# Patient Record
Sex: FEMALE | Race: WHITE | NOT HISPANIC OR LATINO | Employment: OTHER | URBAN - METROPOLITAN AREA
[De-identification: names, ages, dates, MRNs, and addresses within clinical notes are randomized per-mention and may not be internally consistent; named-entity substitution may affect disease eponyms.]

---

## 2021-12-06 ENCOUNTER — HOSPITAL ENCOUNTER (EMERGENCY)
Facility: CLINIC | Age: 71
Discharge: HOME | End: 2021-12-06
Attending: EMERGENCY MEDICINE | Admitting: EMERGENCY MEDICINE
Payer: COMMERCIAL

## 2021-12-06 ENCOUNTER — APPOINTMENT (OUTPATIENT)
Dept: GENERAL RADIOLOGY | Facility: CLINIC | Age: 71
End: 2021-12-06
Attending: EMERGENCY MEDICINE
Payer: COMMERCIAL

## 2021-12-06 ENCOUNTER — APPOINTMENT (OUTPATIENT)
Dept: CT IMAGING | Facility: CLINIC | Age: 71
End: 2021-12-06
Attending: EMERGENCY MEDICINE
Payer: COMMERCIAL

## 2021-12-06 VITALS
OXYGEN SATURATION: 99 % | HEIGHT: 65 IN | SYSTOLIC BLOOD PRESSURE: 122 MMHG | RESPIRATION RATE: 18 BRPM | WEIGHT: 234 LBS | DIASTOLIC BLOOD PRESSURE: 70 MMHG | TEMPERATURE: 98.7 F | HEART RATE: 58 BPM | BODY MASS INDEX: 38.99 KG/M2

## 2021-12-06 DIAGNOSIS — M79.621 PAIN IN BOTH UPPER ARMS: ICD-10-CM

## 2021-12-06 DIAGNOSIS — M79.622 PAIN IN BOTH UPPER ARMS: ICD-10-CM

## 2021-12-06 DIAGNOSIS — M54.6 ACUTE RIGHT-SIDED THORACIC BACK PAIN: ICD-10-CM

## 2021-12-06 LAB
ANION GAP SERPL CALCULATED.3IONS-SCNC: 9 MMOL/L (ref 3–14)
ATRIAL RATE - MUSE: 60 BPM
BASOPHILS # BLD AUTO: 0 10E3/UL (ref 0–0.2)
BASOPHILS NFR BLD AUTO: 0 %
BUN SERPL-MCNC: 11 MG/DL (ref 7–30)
CALCIUM SERPL-MCNC: 9 MG/DL (ref 8.5–10.1)
CHLORIDE BLD-SCNC: 108 MMOL/L (ref 94–109)
CK SERPL-CCNC: 71 U/L (ref 30–225)
CO2 SERPL-SCNC: 25 MMOL/L (ref 20–32)
CREAT SERPL-MCNC: 0.64 MG/DL (ref 0.52–1.04)
DIASTOLIC BLOOD PRESSURE - MUSE: NORMAL MMHG
EOSINOPHIL # BLD AUTO: 0.1 10E3/UL (ref 0–0.7)
EOSINOPHIL NFR BLD AUTO: 1 %
ERYTHROCYTE [DISTWIDTH] IN BLOOD BY AUTOMATED COUNT: 12.4 % (ref 10–15)
GFR SERPL CREATININE-BSD FRML MDRD: 90 ML/MIN/1.73M2
GLUCOSE BLD-MCNC: 109 MG/DL (ref 70–99)
HCT VFR BLD AUTO: 43.6 % (ref 35–47)
HGB BLD-MCNC: 14.1 G/DL (ref 11.7–15.7)
HOLD SPECIMEN: NORMAL
IMM GRANULOCYTES # BLD: 0 10E3/UL
IMM GRANULOCYTES NFR BLD: 0 %
INTERPRETATION ECG - MUSE: NORMAL
LYMPHOCYTES # BLD AUTO: 2.1 10E3/UL (ref 0.8–5.3)
LYMPHOCYTES NFR BLD AUTO: 26 %
MCH RBC QN AUTO: 31.9 PG (ref 26.5–33)
MCHC RBC AUTO-ENTMCNC: 32.3 G/DL (ref 31.5–36.5)
MCV RBC AUTO: 99 FL (ref 78–100)
MONOCYTES # BLD AUTO: 0.7 10E3/UL (ref 0–1.3)
MONOCYTES NFR BLD AUTO: 8 %
NEUTROPHILS # BLD AUTO: 5.4 10E3/UL (ref 1.6–8.3)
NEUTROPHILS NFR BLD AUTO: 65 %
NRBC # BLD AUTO: 0 10E3/UL
NRBC BLD AUTO-RTO: 0 /100
P AXIS - MUSE: 52 DEGREES
PLATELET # BLD AUTO: 261 10E3/UL (ref 150–450)
POTASSIUM BLD-SCNC: 3.9 MMOL/L (ref 3.4–5.3)
PR INTERVAL - MUSE: 198 MS
QRS DURATION - MUSE: 84 MS
QT - MUSE: 442 MS
QTC - MUSE: 442 MS
R AXIS - MUSE: -6 DEGREES
RBC # BLD AUTO: 4.42 10E6/UL (ref 3.8–5.2)
SODIUM SERPL-SCNC: 142 MMOL/L (ref 133–144)
SYSTOLIC BLOOD PRESSURE - MUSE: NORMAL MMHG
T AXIS - MUSE: 19 DEGREES
TROPONIN I SERPL HS-MCNC: 9 NG/L
TROPONIN I SERPL HS-MCNC: 9 NG/L
VENTRICULAR RATE- MUSE: 60 BPM
WBC # BLD AUTO: 8.4 10E3/UL (ref 4–11)

## 2021-12-06 PROCEDURE — 80048 BASIC METABOLIC PNL TOTAL CA: CPT | Performed by: EMERGENCY MEDICINE

## 2021-12-06 PROCEDURE — 85025 COMPLETE CBC W/AUTO DIFF WBC: CPT | Performed by: EMERGENCY MEDICINE

## 2021-12-06 PROCEDURE — 36415 COLL VENOUS BLD VENIPUNCTURE: CPT | Performed by: EMERGENCY MEDICINE

## 2021-12-06 PROCEDURE — 84484 ASSAY OF TROPONIN QUANT: CPT | Performed by: EMERGENCY MEDICINE

## 2021-12-06 PROCEDURE — 250N000011 HC RX IP 250 OP 636: Performed by: EMERGENCY MEDICINE

## 2021-12-06 PROCEDURE — 82550 ASSAY OF CK (CPK): CPT | Performed by: EMERGENCY MEDICINE

## 2021-12-06 PROCEDURE — 70450 CT HEAD/BRAIN W/O DYE: CPT

## 2021-12-06 PROCEDURE — 73030 X-RAY EXAM OF SHOULDER: CPT | Mod: RT

## 2021-12-06 PROCEDURE — 250N000013 HC RX MED GY IP 250 OP 250 PS 637: Performed by: EMERGENCY MEDICINE

## 2021-12-06 PROCEDURE — 84484 ASSAY OF TROPONIN QUANT: CPT | Mod: 91 | Performed by: EMERGENCY MEDICINE

## 2021-12-06 PROCEDURE — 96374 THER/PROPH/DIAG INJ IV PUSH: CPT

## 2021-12-06 PROCEDURE — 93005 ELECTROCARDIOGRAM TRACING: CPT

## 2021-12-06 PROCEDURE — 99285 EMERGENCY DEPT VISIT HI MDM: CPT | Mod: 25

## 2021-12-06 RX ORDER — KETOROLAC TROMETHAMINE 15 MG/ML
15 INJECTION, SOLUTION INTRAMUSCULAR; INTRAVENOUS ONCE
Status: COMPLETED | OUTPATIENT
Start: 2021-12-06 | End: 2021-12-06

## 2021-12-06 RX ORDER — METHOCARBAMOL 750 MG/1
750 TABLET, FILM COATED ORAL 3 TIMES DAILY
Qty: 15 TABLET | Refills: 0 | Status: SHIPPED | OUTPATIENT
Start: 2021-12-06

## 2021-12-06 RX ORDER — ACETAMINOPHEN 500 MG
500 TABLET ORAL EVERY 4 HOURS PRN
Status: DISCONTINUED | OUTPATIENT
Start: 2021-12-06 | End: 2021-12-06 | Stop reason: HOSPADM

## 2021-12-06 RX ADMIN — ACETAMINOPHEN 500 MG: 500 TABLET, FILM COATED ORAL at 11:21

## 2021-12-06 RX ADMIN — KETOROLAC TROMETHAMINE 15 MG: 15 INJECTION, SOLUTION INTRAMUSCULAR; INTRAVENOUS at 08:53

## 2021-12-06 ASSESSMENT — ENCOUNTER SYMPTOMS
COUGH: 0
WEAKNESS: 0
BACK PAIN: 1
HEADACHES: 1
NECK PAIN: 1

## 2021-12-06 ASSESSMENT — MIFFLIN-ST. JEOR: SCORE: 1577.3

## 2021-12-06 NOTE — ED TRIAGE NOTES
Here concern of intermittent bilateral arm pain radiating up to chest and neck ongoing for past week, pain worse to right arm, associated with weakness. ABCs intact.

## 2021-12-06 NOTE — ED PROVIDER NOTES
History   Chief Complaint:  Arm Pain    The history is provided by the patient.      Shelby Diaz is a 71 year old female with history of diabetes who presents with arm pain. The pain started last week as an intermittent burning sensation on the outside of her arms. She took aleve which seemed to help and she took one more before going to bed last night. She then woke up in the middle of the night with pain so intense she stated that she could not move. The pain now progressed to her back shoulder blades, collar bone and stops at her lower back. The pain radiates into her neck and head. She states that her right side is worse. She has been having double vision when she watched tv yesterday. She denies any cold symptoms or a cough. She is a diabetic and states that her blood sugar is normally at 167 when she takes insulin but sometimes reaches 200. She denies any car accidents, falls or trauma. She has no arm weakness. She states that she has been vaccinated but not boosted.       Review of Systems   Eyes: Positive for visual disturbance.   Respiratory: Negative for cough.    Musculoskeletal: Positive for back pain and neck pain.        Arm pain   Neurological: Positive for headaches. Negative for weakness.   All other systems reviewed and are negative.        Allergies:  Contrast Dye  Metformin    Medications:  Hydrocodone   PREMARIN OR  SPIRONOLACTONE  TYLENOL/CODEINE  Aspirin 81 mg  biotin-keratin   enalapril   Phentermine  Atorvastatin  trazodone  insulin aspart protamine-insulin aspart  cholecalciferol    Past Medical History:     Obstipation  Bladder atony  Skin lesions  Morbid obesity  Diabetic peripheral neuropathy  Hypertension  Hirsutism  Pituitary tumor  Insomnia  Bowel obstruction  Hypothyroidism  Vitamin D deficiency   Obstipation  Bladder atony  Skin lesions    Past Surgical History:    Knee Replacement  Appendectomy  Tubal Ligation  ABD Perineum Repair  Total Abdominal Hysterectomy  Colonoscopy  "Diagnostic  Anterior and Posterior Vaginal Repair     Family History:    Alzheimer's disease  Diabetes  Cleft palate    Social History:  Presents with significant other   PCP: Jb Bailey      Physical Exam     Patient Vitals for the past 24 hrs:   BP Temp Temp src Pulse Resp SpO2 Height Weight   12/06/21 1130 122/70 -- -- 58 -- 99 % -- --   12/06/21 0900 (!) 149/93 -- -- 55 -- 99 % -- --   12/06/21 0857 127/67 -- -- 61 -- 96 % -- --   12/06/21 0511 (!) 166/84 98.7  F (37.1  C) Oral 63 18 100 % 1.651 m (5' 5\") 106.1 kg (234 lb)       Physical Exam  General: The patient is alert, in no respiratory distress.    HENT: Mucous membranes moist.    Cardiovascular: Regular rate and rhythm. Good pulses in all four extremities. Normal capillary refill and skin turgor.     Respiratory: Lungs are clear. No nasal flaring. No retractions. No wheezing, no crackles.    Gastrointestinal: Abdomen soft. No guarding, no rebound. No palpable hernias.     Musculoskeletal: No gross deformity. Diffused muscular tenderness on upper extremities. Palpable firmness along right clavicle     Skin: No rashes or petechiae.     Neurologic: The patient is alert and oriented x3. GCS 15. No testable cranial nerve deficit. Follows commands with clear and appropriate speech. Gives appropriate answers. Good strength in all extremities. No gross neurologic deficit. Gross sensation intact. Pupils are round and reactive. No meningismus.     Lymphatic: No cervical adenopathy. No lower extremity swelling.    Psychiatric: The patient is non-tearful.      Emergency Department Course   ECG  ECG obtained at 0522, ECG read at 0900  Sinus rhythm  Low voltage QRS  Borderline ECG   No significant changes found as compared to prior, dated 10/22/2021.  Rate 60 bpm. RI interval 198 ms. QRS duration 84 ms. QT/QTc 442/442 ms. P-R-T axes 52 -6 19.     Imaging:  Head CT w/o contrast   Final Result   IMPRESSION: Diffuse cerebral volume loss and cerebral white matter "   changes consistent with chronic small vessel ischemic disease. No   evidence for acute intracranial pathology.      Radiation dose for this scan was reduced using automated exposure   control, adjustment of the mA and/or kV according to patient size, or   iterative reconstruction technique.       JESSICA CALDERON MD            SYSTEM ID:  [id number]      XR Shoulder Right G/E 3 Views   Final Result   IMPRESSION:   1.  No fracture or joint malalignment.   2.  Moderate right acromioclavicular degenerative arthrosis.   3.  Small subacromial spur.      ARI MCKINLEY MD            SYSTEM ID:  [id number]        Report per radiology    Laboratory:  Labs Ordered and Resulted from Time of ED Arrival to Time of ED Departure   BASIC METABOLIC PANEL - Abnormal       Result Value    Sodium 142      Potassium 3.9      Chloride 108      Carbon Dioxide (CO2) 25      Anion Gap 9      Urea Nitrogen 11      Creatinine 0.64      Calcium 9.0      Glucose 109 (*)     GFR Estimate 90     TROPONIN I - Normal    Troponin I High Sensitivity 9     CK TOTAL - Normal    CK 71     TROPONIN I - Normal    Troponin I High Sensitivity 9     CBC WITH PLATELETS AND DIFFERENTIAL    WBC Count 8.4      RBC Count 4.42      Hemoglobin 14.1      Hematocrit 43.6      MCV 99      MCH 31.9      MCHC 32.3      RDW 12.4      Platelet Count 261      % Neutrophils 65      % Lymphocytes 26      % Monocytes 8      % Eosinophils 1      % Basophils 0      % Immature Granulocytes 0      NRBCs per 100 WBC 0      Absolute Neutrophils 5.4      Absolute Lymphocytes 2.1      Absolute Monocytes 0.7      Absolute Eosinophils 0.1      Absolute Basophils 0.0      Absolute Immature Granulocytes 0.0      Absolute NRBCs 0.0          Emergency Department Course:    Reviewed:  I reviewed nursing notes, vitals and past medical history    Assessments:  0832 I obtained history and examined the patient as noted above.   1025 I rechecked the patient and explained findings.   1149    Patient  rechecked and updated.     Interventions:  0853    Toradol,  15 mg, IV  1121    Tylenol, 500 mg, PO     Disposition:  The patient was discharged to home.     Impression & Plan     Medical Decision Making:  The patient says that she has pain along both arms rating to her shoulder blades and neck worse with movement.  This sounds very musculoskeletal in nature.  I did however consider central cord or spinal cord compression however there is been no weakness.  She has no problem with pronator drift testing.  Her speech is clear.  She did report a problem with her vision but could not describe it does not sound like double vision however.  I considered central brain processes or strokes however her CT scan is reassuring.  She has no other neurologic issues.  This appears to be very musculoskeletal in nature.  I did consider rhabdo however CK is normal.  I do not think this indicates dissection is on both sides of her neck.  The patient is otherwise stable strength intact.  I wrote her for muscle relaxant discussed that she should follow close with her primary care doctor and what symptoms she should return for.  Of note there was a small lump on her right clavicle that appears to be bone spur.  I do not feel is likely any intrathoracic processes.        Diagnosis:    ICD-10-CM    1. Acute right-sided thoracic back pain  M54.6    2. Pain in both upper arms  M79.621     M79.622        Discharge Medications:  Discharge Medication List as of 12/6/2021 11:55 AM      START taking these medications    Details   methocarbamol (ROBAXIN) 750 MG tablet Take 1 tablet (750 mg) by mouth 3 times daily, Disp-15 tablet, R-0, Local Print             Scribe Disclosure:  I, Ha Roman, am serving as a scribe at 8:32 AM on 12/6/2021 to document services personally performed by Lavell Washburn MD based on my observations and the provider's statements to me.              Lavell Washburn MD  12/06/21 [id number]

## 2024-08-27 ENCOUNTER — HOSPITAL ENCOUNTER (EMERGENCY)
Facility: CLINIC | Age: 74
Discharge: LEFT BEFORE DISCHARGE | End: 2024-08-27
Admitting: EMERGENCY MEDICINE
Payer: COMMERCIAL

## 2024-08-27 VITALS
DIASTOLIC BLOOD PRESSURE: 92 MMHG | OXYGEN SATURATION: 98 % | BODY MASS INDEX: 36.17 KG/M2 | SYSTOLIC BLOOD PRESSURE: 117 MMHG | HEART RATE: 84 BPM | WEIGHT: 217.37 LBS | TEMPERATURE: 97.6 F | RESPIRATION RATE: 18 BRPM

## 2024-08-27 PROCEDURE — 99281 EMR DPT VST MAYX REQ PHY/QHP: CPT

## 2024-08-27 ASSESSMENT — COLUMBIA-SUICIDE SEVERITY RATING SCALE - C-SSRS
2. HAVE YOU ACTUALLY HAD ANY THOUGHTS OF KILLING YOURSELF IN THE PAST MONTH?: NO
1. IN THE PAST MONTH, HAVE YOU WISHED YOU WERE DEAD OR WISHED YOU COULD GO TO SLEEP AND NOT WAKE UP?: NO
6. HAVE YOU EVER DONE ANYTHING, STARTED TO DO ANYTHING, OR PREPARED TO DO ANYTHING TO END YOUR LIFE?: NO

## 2024-08-27 NOTE — ED TRIAGE NOTES
Patient reports taking probiotics and dulcolax for constipation. Last 3 days patient has a hard stool that won't come out. ABCs intact

## 2024-08-27 NOTE — ED NOTES
"Pt up to the desk crying asking when she will be seen. Informed her she is checked in, and we will see her. Triage process explained to her. Pt refusing to sign [facility]BS forms, stating \"take me out, I will find a hospital that will see me\" and left crying. Attempted to explain to her process, and we will see her. Still would like to go.   "